# Patient Record
Sex: FEMALE | Race: OTHER | HISPANIC OR LATINO
[De-identification: names, ages, dates, MRNs, and addresses within clinical notes are randomized per-mention and may not be internally consistent; named-entity substitution may affect disease eponyms.]

---

## 2021-02-16 ENCOUNTER — NON-APPOINTMENT (OUTPATIENT)
Age: 71
End: 2021-02-16

## 2021-02-16 ENCOUNTER — APPOINTMENT (OUTPATIENT)
Dept: GASTROENTEROLOGY | Facility: AMBULATORY SURGERY CENTER | Age: 71
End: 2021-02-16
Payer: MEDICARE

## 2021-02-16 PROCEDURE — 45380 COLONOSCOPY AND BIOPSY: CPT

## 2021-03-11 ENCOUNTER — NON-APPOINTMENT (OUTPATIENT)
Age: 71
End: 2021-03-11

## 2021-03-17 ENCOUNTER — APPOINTMENT (OUTPATIENT)
Dept: GASTROENTEROLOGY | Facility: CLINIC | Age: 71
End: 2021-03-17
Payer: MEDICARE

## 2021-03-17 VITALS — BODY MASS INDEX: 25.66 KG/M2 | WEIGHT: 154 LBS | HEIGHT: 65 IN

## 2021-03-17 DIAGNOSIS — K57.90 DIVERTICULOSIS OF INTESTINE, PART UNSPECIFIED, W/OUT PERFORATION OR ABSCESS W/OUT BLEEDING: ICD-10-CM

## 2021-03-17 DIAGNOSIS — R15.9 FULL INCONTINENCE OF FECES: ICD-10-CM

## 2021-03-17 PROCEDURE — 99072 ADDL SUPL MATRL&STAF TM PHE: CPT

## 2021-03-17 PROCEDURE — 99214 OFFICE O/P EST MOD 30 MIN: CPT

## 2021-03-17 RX ORDER — SODIUM SULFATE, POTASSIUM SULFATE, MAGNESIUM SULFATE 17.5; 3.13; 1.6 G/ML; G/ML; G/ML
17.5-3.13-1.6 SOLUTION, CONCENTRATE ORAL
Refills: 0 | Status: DISCONTINUED | COMMUNITY
End: 2021-03-17
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
## 2021-03-17 NOTE — ASSESSMENT
[FreeTextEntry1] : Fecal incontinence\par Hx of colon polyps\par Diverticuloss\par Hx of colon cancer\par Hx of Breast Cancer\par \par \par \par High Fiber diet\par Metamucil 1 tablespoon in 8 oz of water with dinner\par Office f/u in 1 to 2 months\par Repeat colonoscopy in 3 years\par

## 2021-03-17 NOTE — CONSULT LETTER
[Dear  ___] : Dear  [unfilled], [Consult Letter:] : I had the pleasure of evaluating your patient, [unfilled]. [( Thank you for referring [unfilled] for consultation for _____ )] : Thank you for referring [unfilled] for consultation for [unfilled] [Please see my note below.] : Please see my note below. [Consult Closing:] : Thank you very much for allowing me to participate in the care of this patient.  If you have any questions, please do not hesitate to contact me. [Sincerely,] : Sincerely, [FreeTextEntry3] : Don\par \par Aaron Hill MD\par

## 2021-04-29 ENCOUNTER — OUTPATIENT (OUTPATIENT)
Dept: OUTPATIENT SERVICES | Facility: HOSPITAL | Age: 71
LOS: 1 days | Discharge: HOME | End: 2021-04-29

## 2021-04-30 DIAGNOSIS — F41.9 ANXIETY DISORDER, UNSPECIFIED: ICD-10-CM

## 2022-04-22 ENCOUNTER — APPOINTMENT (OUTPATIENT)
Dept: PSYCHIATRY | Facility: CLINIC | Age: 72
End: 2022-04-22
Payer: MEDICARE

## 2022-04-22 DIAGNOSIS — F51.02 ADJUSTMENT INSOMNIA: ICD-10-CM

## 2022-04-22 DIAGNOSIS — F41.9 ANXIETY DISORDER, UNSPECIFIED: ICD-10-CM

## 2022-04-22 PROCEDURE — 99205 OFFICE O/P NEW HI 60 MIN: CPT

## 2022-05-27 ENCOUNTER — APPOINTMENT (OUTPATIENT)
Dept: PSYCHIATRY | Facility: CLINIC | Age: 72
End: 2022-05-27
Payer: MEDICARE

## 2022-05-27 PROCEDURE — 99214 OFFICE O/P EST MOD 30 MIN: CPT

## 2022-06-27 ENCOUNTER — APPOINTMENT (OUTPATIENT)
Dept: PSYCHIATRY | Facility: CLINIC | Age: 72
End: 2022-06-27
Payer: MEDICARE

## 2022-06-27 PROCEDURE — 99214 OFFICE O/P EST MOD 30 MIN: CPT

## 2022-06-27 RX ORDER — ESCITALOPRAM OXALATE 5 MG/1
5 TABLET ORAL
Qty: 30 | Refills: 0 | Status: COMPLETED | COMMUNITY
Start: 2022-04-22 | End: 2022-06-27

## 2022-09-27 ENCOUNTER — APPOINTMENT (OUTPATIENT)
Dept: PSYCHIATRY | Facility: CLINIC | Age: 72
End: 2022-09-27

## 2022-09-27 PROCEDURE — 99214 OFFICE O/P EST MOD 30 MIN: CPT

## 2022-12-12 ENCOUNTER — RX RENEWAL (OUTPATIENT)
Age: 72
End: 2022-12-12

## 2022-12-20 ENCOUNTER — APPOINTMENT (OUTPATIENT)
Dept: PSYCHIATRY | Facility: CLINIC | Age: 72
End: 2022-12-20

## 2022-12-20 PROCEDURE — 99214 OFFICE O/P EST MOD 30 MIN: CPT

## 2022-12-20 NOTE — DISCUSSION/SUMMARY
[FreeTextEntry1] : Assessment: Patient is a 71 yo Filipino female with h/o anxiety and insomnia seen today for medication management. Patient is compliant with medications and tolerating without any new reported side effects. I-stop reviewed, and no issues noticed.\par I-STOP:\par \par Patient Name: Nunu Bergman\par YOB: 1950\par Address: [address] [place] [address]\par Sex: Female\par Rx Written	Rx Dispensed	Drug	Quantity	Days Supply	Prescriber Name	Prescriber Kaylyn #	Payment Method\par 05/19/2021	05/19/2021	oxycodone-acetaminophen 5-325 mg tab	30	5	Ahsok Roth Jr, MD	[id number]	Medicare\par •	Dispenser Claire #[id number]\par \par Patient Name: Nunu Gooden\par YOB: 1950\par Address: [address] [place] [address]\par Sex: Female\par Rx Written	Rx Dispensed	Drug	Quantity	Days Supply	Prescriber Name	Prescriber Kaylyn #	Payment Method\par 01/12/2022	01/16/2022	oxycodone-acetaminophen 5-325 mg tab	30	5	Ashok Roth Jr, MD	[id number]	Medicare\par \par \par \par PLAN:\par Increase Lexapro 10 to 20 mg PO QAM for anxiety (Gave two 10 mg)\par Continue Klonopin 0.5 mg PO PRN for anxiety. #10\par Continue Trazodone 50 mg PO QHS for insomnia\par - Discussed risks and benefits of medications including side effects of GI and sexual side effects with SSRI. Alternative strategies including no intervention discussed with patient. Patient consents to current medications as prescribed.\par - Discussed with patient regarding importance of abstinence and sobriety from alcohol and drugs. Educated about relationship between worsening mood/anxiety symptoms and drug use and improvement of symptoms with abstinence. \par - Discussed about unpredictable effects including cardiorespiratory collapse from the combination of illicit drugs and prescribed medications. Patient verbalized understanding.\par - Patient understands to contact clinic prn with concerns and agrees to call 911 or go to nearest ER if symptoms worsen.\par - Next appointment made in 3 month. Patient left the office without any distress.\par \par
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
## 2022-12-20 NOTE — PHYSICAL EXAM
[None] : none [Normal] : normal/intact; no suicidal/homicidal ideation intent or plan [Anxious] : anxious [Afraid] : afraid [Constricted] : constricted [FreeTextEntry8] : "I'm anxious." better [FreeTextEntry9] : better

## 2022-12-20 NOTE — CURRENT PSYCHIATRIC SYMPTOMS
[Guilt] : feeling guilty [Decreased Concentration] : decreased concentrating ability [Insomnia] : insomnia [Excessive Worry] : excessive worry [Ruminations] : ruminations [Re-experiencing] : re-experiencing [Hypochondriasis] : hypochondriasis [de-identified] : better [de-identified] : denies [de-identified] : denies [de-identified] : better [de-identified] : denies [de-identified] : denies [de-identified] : denies
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
## 2022-12-20 NOTE — PAST MEDICAL HISTORY
[FreeTextEntry1] : H/o anxiety\par \par Denies any inpatient hospitalization admission \par \par Past SI attempts: denies\par \par Therapy: saw one when she was going through treatment for breast cancer\par \par Psychiatrist: 8 years ago\par \par Medication trials: Remeron 15 mg. Did not help. \par \par Current medications: Buspar 10 TID\par \par Firearms: denies\par Medical: HTN, HLD, Sarcoidosis and Breast cancer survivor\par \par

## 2023-03-21 ENCOUNTER — APPOINTMENT (OUTPATIENT)
Dept: PSYCHIATRY | Facility: CLINIC | Age: 73
End: 2023-03-21
Payer: MEDICARE

## 2023-03-21 PROCEDURE — 99214 OFFICE O/P EST MOD 30 MIN: CPT

## 2023-03-21 NOTE — HISTORY OF PRESENT ILLNESS
[de-identified] : \par Patient is here in the office for face to face interview with writer for 3 month follow-up visit.\par \par \par Last month Lexapro 10 to 20 mg. Patient states she likes the medication. Less depression and anxiety. States she has a lot of arthritic pain. Told her to ask her doctor about it to r/o OA or RA. \par Has five 0.5 mg pills of the Klonopin at home.   \par Sleeping 6-8 hrs on the Trazodone 50 mg full. Appetite, energy, concentration and motivation are all improved. Denies any AVH, SI or HI. [Home] : at home, [unfilled] , at the time of the visit. [Medical Office: ([facility])___] : at the medical office located in  [Verbal consent obtained from patient] : the patient, [unfilled]
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
## 2023-03-21 NOTE — DISCUSSION/SUMMARY
[FreeTextEntry1] : Assessment: Patient is a 73 yo Indonesian female with h/o anxiety and insomnia seen today for medication management. Patient is compliant with medications and tolerating without any new reported side effects. I-stop reviewed, and no issues noticed.\par I-STOP:\par \par Patient Name: Nunu Bergman\par YOB: 1950\par Address: [address] [place] [address]\par Sex: Female\par Rx Written	Rx Dispensed	Drug	Quantity	Days Supply	Prescriber Name	Prescriber Kaylyn #	Payment Method\par 05/19/2021	05/19/2021	oxycodone-acetaminophen 5-325 mg tab	30	5	Ashok Roth Jr, MD	[id number]	Medicare\par •	Dispenser Claire #[id number]\par \par Patient Name: Nunu Gooden\par YOB: 1950\par Address: [address] [place] [address]\par Sex: Female\par Rx Written	Rx Dispensed	Drug	Quantity	Days Supply	Prescriber Name	Prescriber Kaylyn #	Payment Method\par 01/12/2022	01/16/2022	oxycodone-acetaminophen 5-325 mg tab	30	5	Ashok Roth Jr, MD	[id number]	Medicare\par \par \par \par PLAN:\par Increase Lexapro 10 to 20 mg PO QAM for anxiety (Gave two 10 mg)\par Continue Klonopin 0.5 mg PO PRN for anxiety. #10\par Continue Trazodone 50 mg PO QHS for insomnia\par - Discussed risks and benefits of medications including side effects of GI and sexual side effects with SSRI. Alternative strategies including no intervention discussed with patient. Patient consents to current medications as prescribed.\par - Discussed with patient regarding importance of abstinence and sobriety from alcohol and drugs. Educated about relationship between worsening mood/anxiety symptoms and drug use and improvement of symptoms with abstinence. \par - Discussed about unpredictable effects including cardiorespiratory collapse from the combination of illicit drugs and prescribed medications. Patient verbalized understanding.\par - Patient understands to contact clinic prn with concerns and agrees to call 911 or go to nearest ER if symptoms worsen.\par - Next appointment made in 3 month. Patient left the office without any distress.\par \par

## 2023-06-20 ENCOUNTER — APPOINTMENT (OUTPATIENT)
Dept: PSYCHIATRY | Facility: CLINIC | Age: 73
End: 2023-06-20
Payer: MEDICARE

## 2023-06-20 PROCEDURE — 99214 OFFICE O/P EST MOD 30 MIN: CPT

## 2023-06-20 NOTE — DISCUSSION/SUMMARY
[FreeTextEntry1] : Assessment: Patient is a 73 yo Montenegrin female with h/o anxiety and insomnia seen today for medication management. Patient is compliant with medications and tolerating without any new reported side effects. I-stop reviewed, and no issues noticed.\par I-STOP:\par \par Patient Name: Nunu Bergman\par YOB: 1950\par Address: [address] [place] [address]\par Sex: Female\par Rx Written	Rx Dispensed	Drug	Quantity	Days Supply	Prescriber Name	Prescriber Kaylyn #	Payment Method\par 05/19/2021	05/19/2021	oxycodone-acetaminophen 5-325 mg tab	30	5	Ashok Roth Jr, MD	[id number]	Medicare\par •	Dispenser Claire #[id number]\par \par Patient Name: Nunu Gooden\par YOB: 1950\par Address: [address] [place] [address]\par Sex: Female\par Rx Written	Rx Dispensed	Drug	Quantity	Days Supply	Prescriber Name	Prescriber Kaylyn #	Payment Method\par 01/12/2022	01/16/2022	oxycodone-acetaminophen 5-325 mg tab	30	5	Ashok Roth Jr, MD	[id number]	Medicare\par \par \par \par PLAN:\par Continue Lexapro 20 mg PO QAM for anxiety\par Continue Klonopin 0.5 mg PO PRN for anxiety. #10\par Continue Trazodone 50 mg PO QHS for insomnia\par - Discussed risks and benefits of medications including side effects of GI and sexual side effects with SSRI. Alternative strategies including no intervention discussed with patient. Patient consents to current medications as prescribed.\par - Discussed with patient regarding importance of abstinence and sobriety from alcohol and drugs. Educated about relationship between worsening mood/anxiety symptoms and drug use and improvement of symptoms with abstinence. \par - Discussed about unpredictable effects including cardiorespiratory collapse from the combination of illicit drugs and prescribed medications. Patient verbalized understanding.\par - Patient understands to contact clinic prn with concerns and agrees to call 911 or go to nearest ER if symptoms worsen.\par - Next appointment made in 3 month. Patient left the office without any distress.\par \par

## 2023-06-20 NOTE — HISTORY OF PRESENT ILLNESS
[de-identified] : \par Patient is here in the office for face to face interview with writer for 3 month follow-up visit.\par \par \par Last month Lexapro 10 to 20 mg. Patient states she likes the medication. Less depression and anxiety. \par Has zero 0.5 mg pills of the Klonopin at home.   \par Sleeping 6-8 hrs on the Trazodone 50 mg full. Appetite, energy, concentration and motivation are all improved. Denies any AVH, SI or HI. [Home] : at home, [unfilled] , at the time of the visit. [Medical Office: ([facility])___] : at the medical office located in  [Verbal consent obtained from patient] : the patient, [unfilled]
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
## 2023-09-12 ENCOUNTER — APPOINTMENT (OUTPATIENT)
Dept: PSYCHIATRY | Facility: CLINIC | Age: 73
End: 2023-09-12
Payer: MEDICARE

## 2023-09-12 PROCEDURE — 99214 OFFICE O/P EST MOD 30 MIN: CPT

## 2023-12-12 ENCOUNTER — APPOINTMENT (OUTPATIENT)
Dept: PSYCHIATRY | Facility: CLINIC | Age: 73
End: 2023-12-12
Payer: MEDICARE

## 2023-12-12 PROCEDURE — 99214 OFFICE O/P EST MOD 30 MIN: CPT

## 2024-03-05 ENCOUNTER — APPOINTMENT (OUTPATIENT)
Dept: PSYCHIATRY | Facility: CLINIC | Age: 74
End: 2024-03-05
Payer: MEDICARE

## 2024-03-05 PROCEDURE — 99214 OFFICE O/P EST MOD 30 MIN: CPT

## 2024-03-05 NOTE — HISTORY OF PRESENT ILLNESS
[de-identified] : Patient is here in the office for face to face interview with writer for 3 month follow-up visit.  No medication changes at that time. States she is doing very well on the current medication regimens.   Mood: Patient states she likes the medication. Less depression and anxiety. Used six 0.5 mg pills of the Klonopin in 3 months.     Sleeping 6-8 hrs on the Trazodone 50 mg full. Appetite, energy, concentration and motivation are all improved. Denies any AVH, SI or HI.

## 2024-03-05 NOTE — DISCUSSION/SUMMARY
[FreeTextEntry1] : Assessment: Patient is a 71 yo Pakistani female with h/o anxiety and insomnia seen today for medication management. Patient is compliant with medications and tolerating without any new reported side effects. I-stop reviewed, and no issues noticed. I-STOP:  Patient Name: Nunu Bergman YOB: 1950 Address: [address] [place] [address] Sex: Female Rx Written	Rx Dispensed	Drug	Quantity	Days Supply	Prescriber Name	Prescriber Kaylyn #	Payment Method 05/19/2021	05/19/2021	oxycodone-acetaminophen 5-325 mg tab	30	5	Ashok Roth Jr, MD	[id number]	Medicare -	Dispenser Claire #[id number]  Patient Name: Nunu Gooden YOB: 1950 Address: [address] [place] [address] Sex: Female Rx Written	Rx Dispensed	Drug	Quantity	Days Supply	Prescriber Name	Prescriber Kaylyn #	Payment Method 01/12/2022	01/16/2022	oxycodone-acetaminophen 5-325 mg tab	30	5	Ashok Roth Jr, MD	[id number]	Medicare    PLAN: Continue Lexapro 20 mg PO QAM for anxiety Continue Klonopin 0.5 mg PO PRN for anxiety. #10 Continue Trazodone 50 mg PO QHS for insomnia - Discussed risks and benefits of medications including side effects of GI and sexual side effects with SSRI. Alternative strategies including no intervention discussed with patient. Patient consents to current medications as prescribed. - Discussed with patient regarding importance of abstinence and sobriety from alcohol and drugs. Educated about relationship between worsening mood/anxiety symptoms and drug use and improvement of symptoms with abstinence.  - Discussed about unpredictable effects including cardiorespiratory collapse from the combination of illicit drugs and prescribed medications. Patient verbalized understanding. - Patient understands to contact clinic prn with concerns and agrees to call 911 or go to nearest ER if symptoms worsen. - Next appointment made in 3 month. Patient left the office without any distress.
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
## 2024-03-12 ENCOUNTER — APPOINTMENT (OUTPATIENT)
Dept: GASTROENTEROLOGY | Facility: CLINIC | Age: 74
End: 2024-03-12
Payer: MEDICARE

## 2024-03-12 VITALS
WEIGHT: 172 LBS | DIASTOLIC BLOOD PRESSURE: 80 MMHG | BODY MASS INDEX: 28.66 KG/M2 | HEART RATE: 84 BPM | SYSTOLIC BLOOD PRESSURE: 130 MMHG | OXYGEN SATURATION: 95 % | HEIGHT: 65 IN

## 2024-03-12 DIAGNOSIS — Z86.010 PERSONAL HISTORY OF COLONIC POLYPS: ICD-10-CM

## 2024-03-12 DIAGNOSIS — Z85.038 PERSONAL HISTORY OF OTHER MALIGNANT NEOPLASM OF LARGE INTESTINE: ICD-10-CM

## 2024-03-12 DIAGNOSIS — Z12.11 ENCOUNTER FOR SCREENING FOR MALIGNANT NEOPLASM OF COLON: ICD-10-CM

## 2024-03-12 DIAGNOSIS — Z85.3 PERSONAL HISTORY OF MALIGNANT NEOPLASM OF BREAST: ICD-10-CM

## 2024-03-12 PROCEDURE — 99204 OFFICE O/P NEW MOD 45 MIN: CPT

## 2024-03-13 PROBLEM — Z86.010 HISTORY OF COLONIC POLYPS: Status: RESOLVED | Noted: 2021-02-16 | Resolved: 2024-03-13

## 2024-03-13 PROBLEM — Z86.010 HISTORY OF COLON POLYPS: Status: ACTIVE | Noted: 2024-03-13

## 2024-03-13 PROBLEM — Z12.11 COLON CANCER SCREENING: Status: ACTIVE | Noted: 2024-03-13

## 2024-03-13 PROBLEM — Z85.038 HISTORY OF MALIGNANT NEOPLASM OF COLON: Status: RESOLVED | Noted: 2021-02-16 | Resolved: 2024-03-13

## 2024-03-13 PROBLEM — Z85.3 HISTORY OF BREAST CANCER: Status: ACTIVE | Noted: 2024-03-13

## 2024-03-13 PROBLEM — Z85.038 HISTORY OF COLON CANCER: Status: ACTIVE | Noted: 2024-03-13

## 2024-03-13 RX ORDER — SODIUM SULFATE, POTASSIUM SULFATE AND MAGNESIUM SULFATE 1.6; 3.13; 17.5 G/177ML; G/177ML; G/177ML
17.5-3.13-1.6 SOLUTION ORAL TWICE DAILY
Qty: 2 | Refills: 0 | Status: ACTIVE | COMMUNITY
Start: 2024-03-13 | End: 1900-01-01

## 2024-03-13 RX ORDER — METOPROLOL SUCCINATE 25 MG/1
25 TABLET, EXTENDED RELEASE ORAL
Refills: 0 | Status: ACTIVE | COMMUNITY

## 2024-03-13 NOTE — CONSULT LETTER
[Dear  ___] : Dear  [unfilled], [Consult Letter:] : I had the pleasure of evaluating your patient, [unfilled]. [( Thank you for referring [unfilled] for consultation for _____ )] : Thank you for referring [unfilled] for consultation for [unfilled] [Please see my note below.] : Please see my note below. [Consult Closing:] : Thank you very much for allowing me to participate in the care of this patient.  If you have any questions, please do not hesitate to contact me. [Sincerely,] : Sincerely, [FreeTextEntry3] : Kvng Hill MD  Gastroenterology [facility]

## 2024-03-13 NOTE — ASSESSMENT
[FreeTextEntry1] : KATY BURRELL was advised to undergo colonoscopy to which she agreed. The procedure will be performed in [facility] with the assistance of an anesthesiologist. The patient was given a Suprep preparation prescription and understood the  procedure as it was explained to her. She was given a booklet distributed by the American Society of Gastrointestinal  Endoscopy explaining the procedure in detail and she understood the risks of the procedure not limited to infection, bleeding, perforation or non- diagnosis of colorectal cancer. She was advised that she could not drive home, if she chooses to  receive sedation.  Further diagnostic and treatment recommendations will be based upon the procedure and any biopsies, if they are taken.  Thank you for allowing me to participate in this [facility] care.  , Best personal regards -- Don   I spent 46 minutes with the patient and answered all of her questions

## 2024-03-13 NOTE — HISTORY OF PRESENT ILLNESS
[FreeTextEntry1] : She is an asymptomatic 73-year-old female referred for a screening colonoscopy.  She has a past history of colon cancer and colon polyps.  Her last colonoscopy was in 2020 in which 2 polyps were removed.  She denies a family history of colon cancer.  She denies constipation diarrhea rectal bleeding abdominal pain or weight loss.  She also has a past history of breast cancer

## 2024-06-04 ENCOUNTER — APPOINTMENT (OUTPATIENT)
Dept: PSYCHIATRY | Facility: CLINIC | Age: 74
End: 2024-06-04
Payer: MEDICARE

## 2024-06-04 PROCEDURE — 99214 OFFICE O/P EST MOD 30 MIN: CPT

## 2024-06-04 RX ORDER — TRAZODONE HYDROCHLORIDE 50 MG/1
50 TABLET ORAL
Qty: 90 | Refills: 0 | Status: ACTIVE | COMMUNITY
Start: 2022-04-22 | End: 1900-01-01

## 2024-06-04 RX ORDER — CLONAZEPAM 0.5 MG/1
0.5 TABLET ORAL DAILY
Qty: 10 | Refills: 0 | Status: ACTIVE | COMMUNITY
Start: 2022-04-22 | End: 1900-01-01

## 2024-06-04 RX ORDER — ESCITALOPRAM OXALATE 20 MG/1
20 TABLET ORAL DAILY
Qty: 90 | Refills: 0 | Status: ACTIVE | COMMUNITY
Start: 2022-04-22 | End: 1900-01-01

## 2024-06-04 NOTE — HISTORY OF PRESENT ILLNESS
[de-identified] : Patient is here in the office for face to face interview with writer for 3 month follow-up visit.  No medication changes at that time. States she is doing very well on the current medication regimen.  Mood: Patient states she likes the medication. Less depression and anxiety. Used six 0.5 mg pills of the Klonopin in 3 months.     Sleeping 6-8 hrs on the Trazodone 50 mg full. Appetite, energy, concentration and motivation are all improved. Denies any AVH, SI or HI.

## 2024-06-04 NOTE — DISCUSSION/SUMMARY
[FreeTextEntry1] : Assessment: Patient is a 73 yo Swazi female with h/o anxiety and insomnia seen today for medication management. Patient is compliant with medications and tolerating without any new reported side effects. I-stop reviewed, and no issues noticed. I-STOP:  Patient Name: Nunu Bergman YOB: 1950 Address: [address] [place] [address] Sex: Female Rx Written	Rx Dispensed	Drug	Quantity	Days Supply	Prescriber Name	Prescriber Kaylyn #	Payment Method 05/19/2021	05/19/2021	oxycodone-acetaminophen 5-325 mg tab	30	5	Ashok Roth Jr, MD	[id number]	Medicare -	Dispenser Claire #[id number]  Patient Name: Nunu Gooden YOB: 1950 Address: [address] [place] [address] Sex: Female Rx Written	Rx Dispensed	Drug	Quantity	Days Supply	Prescriber Name	Prescriber Kaylyn #	Payment Method 01/12/2022	01/16/2022	oxycodone-acetaminophen 5-325 mg tab	30	5	Ashok Roth Jr, MD	[id number]	Medicare    PLAN: Continue Lexapro 20 mg PO QAM for anxiety Continue Klonopin 0.5 mg PO PRN for anxiety. #10 Continue Trazodone 50 mg PO QHS for insomnia - Discussed risks and benefits of medications including side effects of GI and sexual side effects with SSRI. Alternative strategies including no intervention discussed with patient. Patient consents to current medications as prescribed. - Discussed with patient regarding importance of abstinence and sobriety from alcohol and drugs. Educated about relationship between worsening mood/anxiety symptoms and drug use and improvement of symptoms with abstinence.  - Discussed about unpredictable effects including cardiorespiratory collapse from the combination of illicit drugs and prescribed medications. Patient verbalized understanding. - Patient understands to contact clinic prn with concerns and agrees to call 911 or go to nearest ER if symptoms worsen. - Next appointment made in 3 month. Patient left the office without any distress.
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
## 2024-06-10 ENCOUNTER — RESULT REVIEW (OUTPATIENT)
Age: 74
End: 2024-06-10

## 2024-06-10 ENCOUNTER — APPOINTMENT (OUTPATIENT)
Dept: GASTROENTEROLOGY | Facility: AMBULATORY SURGERY CENTER | Age: 74
End: 2024-06-10
Payer: MEDICARE

## 2024-06-10 PROCEDURE — 45380 COLONOSCOPY AND BIOPSY: CPT | Mod: 59

## 2024-06-10 PROCEDURE — 45385 COLONOSCOPY W/LESION REMOVAL: CPT

## 2024-07-09 ENCOUNTER — APPOINTMENT (OUTPATIENT)
Dept: GASTROENTEROLOGY | Facility: CLINIC | Age: 74
End: 2024-07-09
Payer: MEDICARE

## 2024-07-09 VITALS
TEMPERATURE: 98 F | RESPIRATION RATE: 14 BRPM | BODY MASS INDEX: 28.49 KG/M2 | WEIGHT: 171 LBS | DIASTOLIC BLOOD PRESSURE: 80 MMHG | HEIGHT: 65 IN | OXYGEN SATURATION: 99 % | HEART RATE: 80 BPM | SYSTOLIC BLOOD PRESSURE: 128 MMHG

## 2024-07-09 DIAGNOSIS — Z85.038 PERSONAL HISTORY OF OTHER MALIGNANT NEOPLASM OF LARGE INTESTINE: ICD-10-CM

## 2024-07-09 DIAGNOSIS — Z86.010 PERSONAL HISTORY OF COLONIC POLYPS: ICD-10-CM

## 2024-07-09 DIAGNOSIS — Z12.11 ENCOUNTER FOR SCREENING FOR MALIGNANT NEOPLASM OF COLON: ICD-10-CM

## 2024-07-09 PROCEDURE — 99213 OFFICE O/P EST LOW 20 MIN: CPT

## 2024-10-09 ENCOUNTER — APPOINTMENT (OUTPATIENT)
Dept: PSYCHIATRY | Facility: CLINIC | Age: 74
End: 2024-10-09
Payer: MEDICARE

## 2024-10-09 PROCEDURE — 99214 OFFICE O/P EST MOD 30 MIN: CPT

## 2025-04-02 ENCOUNTER — APPOINTMENT (OUTPATIENT)
Dept: PSYCHIATRY | Facility: CLINIC | Age: 75
End: 2025-04-02
Payer: MEDICARE

## 2025-04-02 PROCEDURE — 99214 OFFICE O/P EST MOD 30 MIN: CPT
